# Patient Record
Sex: MALE | Race: WHITE | NOT HISPANIC OR LATINO
[De-identification: names, ages, dates, MRNs, and addresses within clinical notes are randomized per-mention and may not be internally consistent; named-entity substitution may affect disease eponyms.]

---
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
## 2023-06-19 NOTE — ASSESSMENT
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 He has no problems falling asleep. He wakes up once usually and will go back to sleep fairly quickly. He wakes up in the morning and gets out of bed around 7:30am. He feels somewhat refreshed in the morning and well during the day, not sleepy and he is able to function with everything he needs. He started college recently. He has not gained any weight in the past several months. No snoring, nocturnal gasping and choking. No headaches in the morning. He sleeps for about 6-7 hours and is unable to maintain sleep beyond this. \par \par Insufficient sleep time: advised on dangers of insufficient sleep and strategies to get more sleep, aiming for a minimum of 7 hours of sleep consistently per night.\par Sleep maintenance insomnia\par Sleep hygeine: Advised patient on good sleep hygiene measures such as avoidance of caffeine given its long half life. Also advised on avoidance of blue light before bed and in the middle of the night during awakenings. Described the importance of anchoring the wake up time, maintaining a consistent routine at bedtime and avoidance of alcohol at bedtime.\par CBT: I explained the purpose of cognitive behavioral therapy for insomnia and the various cognitive and behavioral skills that the patient would learn, such as relaxation skills and behavioral condition to help with sleep onset.\par \par Will get HSAT to r/o sleep apnea\par If normal, will refer to CBT for sleep maintenance insomnia\par  No